# Patient Record
Sex: MALE | Race: WHITE | NOT HISPANIC OR LATINO | Employment: FULL TIME | URBAN - METROPOLITAN AREA
[De-identification: names, ages, dates, MRNs, and addresses within clinical notes are randomized per-mention and may not be internally consistent; named-entity substitution may affect disease eponyms.]

---

## 2018-02-12 ENCOUNTER — HOSPITAL ENCOUNTER (EMERGENCY)
Facility: MEDICAL CENTER | Age: 33
End: 2018-02-12
Attending: EMERGENCY MEDICINE
Payer: COMMERCIAL

## 2018-02-12 VITALS
RESPIRATION RATE: 18 BRPM | BODY MASS INDEX: 32.07 KG/M2 | OXYGEN SATURATION: 98 % | HEART RATE: 54 BPM | SYSTOLIC BLOOD PRESSURE: 129 MMHG | TEMPERATURE: 97.6 F | HEIGHT: 70 IN | DIASTOLIC BLOOD PRESSURE: 76 MMHG | WEIGHT: 223.99 LBS

## 2018-02-12 DIAGNOSIS — L03.211 CELLULITIS OF FACE: ICD-10-CM

## 2018-02-12 DIAGNOSIS — Z86.39 HISTORY OF DIABETES MELLITUS, TYPE II: ICD-10-CM

## 2018-02-12 LAB — GLUCOSE BLD-MCNC: 95 MG/DL (ref 65–99)

## 2018-02-12 PROCEDURE — 700101 HCHG RX REV CODE 250: Performed by: EMERGENCY MEDICINE

## 2018-02-12 PROCEDURE — 82962 GLUCOSE BLOOD TEST: CPT

## 2018-02-12 PROCEDURE — 10160 PNXR ASPIR ABSC HMTMA BULLA: CPT

## 2018-02-12 PROCEDURE — 99283 EMERGENCY DEPT VISIT LOW MDM: CPT

## 2018-02-12 RX ORDER — DOXYCYCLINE 100 MG/1
100 CAPSULE ORAL 2 TIMES DAILY
Qty: 20 CAP | Refills: 0 | Status: SHIPPED | OUTPATIENT
Start: 2018-02-12 | End: 2018-02-22

## 2018-02-12 RX ADMIN — LIDOCAINE HYDROCHLORIDE 8 ML: 20 INJECTION, SOLUTION INFILTRATION; PERINEURAL at 09:45

## 2018-02-12 ASSESSMENT — PAIN SCALES - GENERAL
PAINLEVEL_OUTOF10: 5
PAINLEVEL_OUTOF10: 1
PAINLEVEL_OUTOF10: 5

## 2018-02-12 NOTE — ED NOTES
Pt discharged to home. Pt was given follow up instructions and prescriptions for doxycycline. Pt verbalized understanding of all instructions for discharge and is ambulatory out of ED with steady gait.

## 2018-02-12 NOTE — ED TRIAGE NOTES
Pt ambulated to triage with   Chief Complaint   Patient presents with   • Eye Pain     seen at  and sent here for possible orbital cellulitis; left eye lid and just above eye, swelling noted, thought that it was just a pimple last week and starting saturday got worse.     Pt reports he is a diabetic.  Pt denies vision changes.   Pt also reports that his dog is getting treated for a skin disease and ointment is being given for it.  Pt Informed regarding triage process and verbalized understanding to inform triage tech or RN for any changes in condition. Placed in lobby.

## 2018-02-12 NOTE — ED PROVIDER NOTES
"ED Provider Note    CHIEF COMPLAINT  Chief Complaint   Patient presents with   • Eye Pain     seen at  and sent here for possible orbital cellulitis; left eye lid and just above eye, swelling noted, thought that it was just a pimple last week and starting saturday got worse.       HPI  Ant Inman is a 32 y.o. male who presents sent from urgent care for concerns of orbital cellulitis. Patient is a type II diabetic who takes metformin 800 mg a day and who developed a pimple above his left eye 4 days ago. The patient squeezed it and the redness has spread and there is now edema in the left upper eyelid. Denies headache, fever, eye pain, proptosis, vision change. No diplopia.    REVIEW OF SYSTEMS  Pertinent positives include: Facial infection.  Pertinent negatives include: Fever, vomiting, dizziness.    PAST MEDICAL HISTORY  Past Medical History:   Diagnosis Date   • Bronchitis    • Compartment syndrome     B/L lower extremities   • Hypertension     no meds       SOCIAL HISTORY  planning a trip to [place] in 4 days.    CURRENT MEDICATIONS  Metformin    ALLERGIES  Allergies   Allergen Reactions   • Percocet [Oxycodone-Acetaminophen] Hives and Itching     RXN=4 years ago   • Other Environmental        PHYSICAL EXAM  VITAL SIGNS: /78   Pulse (!) 50   Temp 36.4 °C (97.6 °F) (Temporal)   Resp 16   Ht 1.778 m (5' 10\")   Wt 101.6 kg (223 lb 15.8 oz)   SpO2 98%   BMI 32.14 kg/m²   Constitutional :  Well developed, Well nourished, borderline blood pressure elevation, bradycardic.   HNT: 1-1/2 cm swollen red nonfluctuant area just above left lateral orbital ridge. Edema tracks in the upper eyelid. No proptosis..   Ears: External ears normal.  Eyes: pupils reactive without eye discharge nor conjunctival hyperemia. Extraocular movements intact without diplopia  Neck: Normal range of motion, No tenderness, Supple, No stridor.   Lymphatic: No preauricular or cervical adenopathy.   Cardiovascular: Regular rhythm, No " murmurs, No rubs, No gallops.  No cyanosis.       LABORATORY:  Results for orders placed or performed in visit on 08/13/16   POCT Glucose   Result Value Ref Range    Glucose - Accu-Ck 127 (A) 70 - 100 mg/dL     Visual acuity 20/15 with corrective glasses    COURSE & MEDICAL DECISION MAKING  Well-appearing patient presents with a small facial cellulitis resulting from a comedone without evidence of preseptal cellulitis nor orbital cellulitis. Diabetes is well-controlled    Area of erythema as anesthetized the standard sterile technique with 2 mL of 2% lidocaine. Needle aspiration performed and no abscess cavity encountered. Patient tolerated procedure well.    PLAN:  Discharge Medication List as of 2/12/2018 10:41 AM      START taking these medications    Details   doxycycline (MONODOX) 100 MG capsule Take 1 Cap by mouth 2 times a day for 10 days., Disp-20 Cap, R-0, Print Rx Paper           Ibuprofen and Tylenol  Continue metformin  Facial cellulitis handout given  Return for redness spreading more than 1 inch, high fever, ill appearance, eye or orbit pain, severe headache or ill-appearing.    Ruben Hanks M.D.  [address] [facility] [address]  [phone]    Schedule an appointment as soon as possible for a visit in 3 days  As needed        CONDITION:  Good.    FINAL IMPRESSION:  1. Cellulitis of face    2. History of diabetes mellitus, type II          Electronically signed by: Blue Lopez, 2/12/2018

## 2018-02-12 NOTE — DISCHARGE INSTRUCTIONS
Facial Infection  Take antibiotic as prescribed and continue metformin. Follow-up with the physician for abscess formation or redness spreading more than 1 inch. Also follow-up for protruding eye, periorbital pain, severe headache, uncontrolled vomiting, dizziness, ill appearance.    You have an infection of your face. This requires special attention to help prevent serious problems. Infections in facial wounds can cause poor healing and scars. They can also spread to deeper tissues, especially around the eye. Wound and dental infections can lead to sinusitis, infection of the eye socket, and even meningitis. Permanent damage to the skin, eye, and nervous system may result if facial infections are not treated properly. With severe infections, hospital care for IV antibiotic injections may be needed if they don't respond to oral antibiotics.  Antibiotics must be taken for the full course to insure the infection is eliminated. If the infection came from a bad tooth, it may have to be extracted when the infection is under control. Warm compresses may be applied to reduce skin irritation and remove drainage.  You might need a tetanus shot now if:  · You cannot remember when your last tetanus shot was.   · You have never had a tetanus shot.   · The object that caused your wound was dirty.   If you need a tetanus shot, and you decide not to get one, there is a rare chance of getting tetanus. Sickness from tetanus can be serious. If you got a tetanus shot, your arm may swell, get red and warm to the touch at the shot site. This is common and not a problem.  SEEK IMMEDIATE MEDICAL CARE IF:   · You have increased swelling, redness, or trouble breathing.   · You have a severe headache, dizziness, nausea, or vomiting.   · You develop problems with your eyesight.   · You have a fever.   Document Released: 01/25/2006 Document Revised: 03/11/2013 Document Reviewed: 12/18/2006  [employer]® Patient Information ©2013 [employer].

## 2020-08-28 ENCOUNTER — HOSPITAL ENCOUNTER (OUTPATIENT)
Dept: HOSPITAL 8 - CVU | Age: 35
Discharge: HOME | End: 2020-08-28
Attending: INTERNAL MEDICINE
Payer: COMMERCIAL

## 2020-08-28 DIAGNOSIS — I10: Primary | ICD-10-CM

## 2020-08-28 DIAGNOSIS — R07.9: ICD-10-CM

## 2020-08-28 PROCEDURE — 93306 TTE W/DOPPLER COMPLETE: CPT

## 2022-07-28 ENCOUNTER — TELEPHONE (OUTPATIENT)
Dept: SCHEDULING | Facility: IMAGING CENTER | Age: 37
End: 2022-07-28
Payer: COMMERCIAL

## 2022-07-29 ENCOUNTER — OFFICE VISIT (OUTPATIENT)
Dept: MEDICAL GROUP | Facility: PHYSICIAN GROUP | Age: 37
End: 2022-07-29
Payer: COMMERCIAL

## 2022-07-29 VITALS
OXYGEN SATURATION: 97 % | TEMPERATURE: 98.5 F | BODY MASS INDEX: 26.77 KG/M2 | WEIGHT: 187 LBS | DIASTOLIC BLOOD PRESSURE: 60 MMHG | HEART RATE: 45 BPM | HEIGHT: 70 IN | SYSTOLIC BLOOD PRESSURE: 140 MMHG

## 2022-07-29 DIAGNOSIS — I10 BENIGN ESSENTIAL HTN: ICD-10-CM

## 2022-07-29 DIAGNOSIS — E11.9 TYPE 2 DIABETES MELLITUS WITHOUT COMPLICATION, WITHOUT LONG-TERM CURRENT USE OF INSULIN (HCC): ICD-10-CM

## 2022-07-29 DIAGNOSIS — R10.84 GENERALIZED ABDOMINAL PAIN: ICD-10-CM

## 2022-07-29 PROCEDURE — 99204 OFFICE O/P NEW MOD 45 MIN: CPT | Performed by: STUDENT IN AN ORGANIZED HEALTH CARE EDUCATION/TRAINING PROGRAM

## 2022-07-29 RX ORDER — OMEPRAZOLE 20 MG/1
20 CAPSULE, DELAYED RELEASE ORAL DAILY
Qty: 90 CAPSULE | Refills: 0 | Status: SHIPPED | OUTPATIENT
Start: 2022-07-29 | End: 2022-10-27

## 2022-07-29 ASSESSMENT — PATIENT HEALTH QUESTIONNAIRE - PHQ9: CLINICAL INTERPRETATION OF PHQ2 SCORE: 0

## 2022-07-29 NOTE — PROGRESS NOTES
Subjective:     CC:  Diagnoses of Type 2 diabetes mellitus without complication, without long-term current use of insulin (HCC) and Benign essential HTN were pertinent to this visit.    HISTORY OF THE PRESENT ILLNESS: Patient is a 36 y.o. male. This pleasant patient is here today to establish care and for ER follow-up.. His/her prior PCP was Monica Harrell..    Over the past year the patient has had substantial deliberate weight loss, almost 40 pounds.  In that time he has discontinued his atorvastatin, metformin and 1 blood pressure medication that he believes to be losartan.    1.  Abdominal pain  Patient had a thorough work-up in ER on 7/16/2022.  He was ultimately discharged with a month supply pantoprazole and is to follow-up with primary care.  His symptoms have resolved.    2.  Type 2 diabetes mellitus  Patient tells me previously had A1c's in the 8 range but has lost between 40 and 50 pounds and discontinued his metformin.    3.  Benign essential hypertension  Patient was previously on blood pressure medication, he believes losartan, he has discontinued this.  He does have the ability to take his blood pressure at home.    Active Diagnosis:    Patient Active Problem List   Diagnosis   • Chest pain   • Type 2 diabetes mellitus without complication, without long-term current use of insulin (HCC)   • Benign essential HTN        Current Outpatient Medications Ordered in Epic   Medication Sig Dispense Refill   • omeprazole (PRILOSEC) 20 MG delayed-release capsule Take 1 Capsule by mouth every day. 90 Capsule 0   • tramadol (ULTRAM) 50 MG Tab Take 1-2 Tabs by mouth every 6 hours as needed. (Patient not taking: Reported on 7/29/2022) 30 Tab 0   • meloxicam (MOBIC) 15 MG tablet Take 1 Tab by mouth every day. as needed for pain (Patient not taking: Reported on 7/29/2022) 20 Tab 0   • hydrocodone-acetaminophen (NORCO) 5-325 MG Tab per tablet Take 1 Tab by mouth every 6 hours as needed. (Patient not taking: Reported on  "7/29/2022) 16 Tab 0   • atorvastatin (LIPITOR) 10 MG Tab Take 1 Tab by mouth every bedtime. (Patient not taking: Reported on 7/29/2022) 30 Tab 0   • metformin (GLUCOPHAGE) 500 MG Tab Take 1 Tab by mouth 2 times a day, with meals. (Patient not taking: Reported on 7/29/2022) 60 Tab 0     No current Epic-ordered facility-administered medications on file.     ROS:   Gen: No fevers/chills, no changes in weight  HEENT: No changes in vision/hearing, sore throat.  Pulm: No cough, unexplained SOB.  CV: No chest pain/pressure, no palpitations  GI: No nausea/vomiting, no diarrhea  : No dysuria/nocturia  MSk: No myalgias  Skin: No rash/skin changes  Neuro: No headaches, no numbness/tingling  Heme/Lymph: no easy bruising      Objective:     Exam: BP (!) 140/60 (BP Location: Right arm, Patient Position: Sitting, BP Cuff Size: Adult)   Pulse (!) 45   Temp 36.9 °C (98.5 °F) (Temporal)   Ht 1.778 m (5' 10\")   Wt 84.8 kg (187 lb)   SpO2 97%  Body mass index is 26.83 kg/m².    General: Normal appearing. No distress.  HEENT: Normocephalic. Eyes conjunctiva clear lids without ptosis, pupils equal and reactive to light accommodation. Ears normal shape and contour, canals are clear bilaterally, tympanic membranes are benign, nasal mucosa benign, oropharynx is without erythema, edema or exudates.   Neck: Supple without JVD. Thyroid is not enlarged.  Pulmonary: Clear to ausculation.  Normal effort. No rales, ronchi, or wheezing.  Cardiovascular: Regular rate and rhythm without murmur. Radial pulses are intact and equal bilaterally.  Abdomen: Nondistended.   Neurologic: Grossly nonfocal.  CN II through XII intact.  Lymph: No cervical or supraclavicular lymph nodes are palpable  Skin: Warm and dry.  No obvious lesions.  Musculoskeletal: Normal gait. No extremity cyanosis, clubbing, or edema.  Psych: Normal mood and affect. Alert and oriented x3. Judgment and insight are normal.    A chaperone was offered to the patient during today's " exam. Patient declined chaperone.    Labs:   7/17/2022:  -Hemoglobin A1c 5.9%  -Lipid profile showing total cholesterol 123, triglycerides 64, HDL 42, LDL 68  -CMP showing diminished total protein of 6.3  -CBC, WNL    Assessment & Plan:   36 y.o. male with the following -    1.  Abdominal pain  -Acute uncomplicated illness.  -Finish course of pantoprazole.  -After is completed pantoprazole start omeprazole 20 mg daily.  Dispense 90.  Refill 0.  -We will reevaluate his need for continued PPI at his next visit.    2.  Type 2 diabetes mellitus  -Chronic.  A1c at goal without medications.  -I recommended the patient continue with his current excellent diet and exercise regimen.    3.  Benign essential hypertension  -Unknown status.    -I have asked the patient to return in 1 week with blood pressure log for evaluation.    4.  Encounter to establish care.  -  We discussed diet/exercise/healthy weight maintenance.  We discussed the need to always wear seatbelt.  I have recommended regular dentistry.    Return in about 5 months (around 12/29/2022).  To evaluate the need for continued PPI, diabetes and hypertension monitoring.  Vaccinations as indicated.    Please note that this dictation was created using voice recognition software. I have made every reasonable attempt to correct obvious errors, but I expect that there are errors of grammar and possibly content that I did not discover before finalizing the note.      Brock Enciso PA-C 7/29/2022

## 2022-07-29 NOTE — PATIENT INSTRUCTIONS
1) Complete your Pantoprazole prescription, then start Omeprazole 20 mg for 90 days, then trial a period to see if our symptoms return.    2) Return a seven day blood pressure log.

## 2022-09-07 ENCOUNTER — TELEPHONE (OUTPATIENT)
Dept: MEDICAL GROUP | Facility: PHYSICIAN GROUP | Age: 37
End: 2022-09-07
Payer: COMMERCIAL

## 2022-09-07 NOTE — TELEPHONE ENCOUNTER
----- Message from Brock Enciso P.A.-C. sent at 9/6/2022  5:04 PM PDT -----  Please contact Mr. Hussein by telephone and thank him for sending in a blood pressure log.  His blood pressures look well controlled.  No need for intervention at this point time.    Brock Enciso PA-C

## 2022-09-07 NOTE — PROGRESS NOTES
Patient returns in extensive blood pressure log.  7 highest readings from the previous 7 days show an average systolic 126.  No diastolics greater than 90.    Brock Enciso PA-C

## 2022-09-07 NOTE — TELEPHONE ENCOUNTER
Phone Number Called:[phone] (home)         Call outcome: Spoke to patient regarding message below.    Message: pt notified

## 2022-10-27 RX ORDER — OMEPRAZOLE 20 MG/1
20 CAPSULE, DELAYED RELEASE ORAL DAILY
Qty: 90 CAPSULE | Refills: 0 | Status: SHIPPED | OUTPATIENT
Start: 2022-10-27

## 2024-04-10 ENCOUNTER — OFFICE VISIT (OUTPATIENT)
Dept: URGENT CARE | Facility: CLINIC | Age: 39
End: 2024-04-10
Payer: COMMERCIAL

## 2024-04-10 VITALS
BODY MASS INDEX: 28.63 KG/M2 | HEIGHT: 70 IN | HEART RATE: 66 BPM | TEMPERATURE: 98.2 F | SYSTOLIC BLOOD PRESSURE: 116 MMHG | OXYGEN SATURATION: 95 % | RESPIRATION RATE: 16 BRPM | DIASTOLIC BLOOD PRESSURE: 70 MMHG | WEIGHT: 200 LBS

## 2024-04-10 DIAGNOSIS — B96.89 BACTERIAL URI: ICD-10-CM

## 2024-04-10 DIAGNOSIS — H10.9 BACTERIAL CONJUNCTIVITIS OF LEFT EYE: ICD-10-CM

## 2024-04-10 DIAGNOSIS — J06.9 BACTERIAL URI: ICD-10-CM

## 2024-04-10 DIAGNOSIS — J01.90 ACUTE BACTERIAL SINUSITIS: Primary | ICD-10-CM

## 2024-04-10 DIAGNOSIS — B96.89 ACUTE BACTERIAL SINUSITIS: Primary | ICD-10-CM

## 2024-04-10 PROCEDURE — 3074F SYST BP LT 130 MM HG: CPT

## 2024-04-10 PROCEDURE — 99213 OFFICE O/P EST LOW 20 MIN: CPT

## 2024-04-10 PROCEDURE — 3078F DIAST BP <80 MM HG: CPT

## 2024-04-10 RX ORDER — PREDNISONE 10 MG/1
20 TABLET ORAL DAILY
Qty: 6 TABLET | Refills: 0 | Status: SHIPPED | OUTPATIENT
Start: 2024-04-10 | End: 2024-04-13

## 2024-04-10 RX ORDER — CETIRIZINE HYDROCHLORIDE 10 MG/1
10 TABLET ORAL DAILY
COMMUNITY

## 2024-04-10 RX ORDER — POLYMYXIN B SULFATE AND TRIMETHOPRIM 1; 10000 MG/ML; [USP'U]/ML
1 SOLUTION OPHTHALMIC EVERY 4 HOURS
Qty: 10 ML | Refills: 0 | Status: SHIPPED | OUTPATIENT
Start: 2024-04-10 | End: 2024-04-20

## 2024-04-10 RX ORDER — AMOXICILLIN AND CLAVULANATE POTASSIUM 875; 125 MG/1; MG/1
1 TABLET, FILM COATED ORAL 2 TIMES DAILY
Qty: 14 TABLET | Refills: 0 | Status: SHIPPED | OUTPATIENT
Start: 2024-04-10 | End: 2024-04-17

## 2024-04-10 RX ORDER — FLUTICASONE PROPIONATE 50 MCG
1 SPRAY, SUSPENSION (ML) NASAL DAILY
COMMUNITY

## 2024-04-10 ASSESSMENT — FIBROSIS 4 INDEX: FIB4 SCORE: 0.46

## 2024-04-10 NOTE — PROGRESS NOTES
Subjective:   Ant Inman is a 38 y.o. male who presents for Sinus Problem (Head pressure, (B) ear pressure x 10 days )          I introduced myself to the patient and informed them that I am a family nurse practitioner.    HPI:Ant is a 38 year-old  comes in today c/o sinus pain, pressure, thick green and bloody nasal drainage, cough with green sputum. Onset was over 10 days ago. He also developed redness of his L eye 2 days ago, with thick yellow draiange and crusting of his eyelashes. States his wife was dx and treated for pink eye a week ago. Patient describes symptoms as constant. They describe the cough as productive with green sputum. Aggravating factors include leaning forward, blowing his nose exacerbates sinus pain. Relieving factors include none. Treatments tried at home include none. They describe their symptoms as moderate.Denies any known exposure to Covid, Flu, RSV, strep. Denies anyone else is sick at home presently.  He denies any fever, chills, nausea or vomiting, chest pain, difficulty breathing        Review of Systems   Constitutional:  Positive for chills and malaise/fatigue.   HENT:  Positive for congestion and sinus pain. Negative for ear discharge, ear pain, hearing loss and sore throat.    Eyes:  Positive for discharge and redness. Negative for blurred vision, double vision, photophobia and pain.   Respiratory:  Positive for cough. Negative for hemoptysis, sputum production, shortness of breath and wheezing.    Cardiovascular:  Negative for chest pain and palpitations.   Gastrointestinal:  Negative for heartburn, nausea and vomiting.   Genitourinary:  Negative for dysuria.   Musculoskeletal:  Negative for myalgias.   Skin:  Negative for rash.   Neurological:  Positive for headaches. Negative for dizziness.   Psychiatric/Behavioral:  Negative for depression.    All other systems reviewed and are negative.      Medications: lisinopril Tabs  meloxicam  methocarbamol  "Tabs  methylPREDNISolone Tbpk  omeprazole     Allergies: Oxycodone-acetaminophen, Percocet [oxycodone-acetaminophen], and Other environmental    Problem List: does not have any pertinent problems on file.    Surgical History:  Past Surgical History:   Procedure Laterality Date    FASCIOTOMY  10/4/2011    Performed by DEREJE GIVENS at SURGERY [facility]S    DENTAL EXTRACTION(S)  2006    wisdom teeth x 4    TONSILLECTOMY  2000       Past Social Hx:   reports that he has never smoked. He has never used smokeless tobacco. He reports that he does not currently use alcohol after a past usage of about 0.5 oz of alcohol per week. He reports current drug use. Drugs: Marijuana and Inhaled.     Past Family Hx:   family history includes Diabetes in his unknown relative; Heart Disease in his unknown relative; Hypertension in his unknown relative.     Problem list, medications, and allergies reviewed by myself today in Epic.   I have documented what I find to be significant in regards to past medical, social, family and surgical history  in my HPI or under PMH/PSH/FH review section, otherwise it is noncontributory     Objective:     /70 (BP Location: Left arm, Patient Position: Sitting, BP Cuff Size: Adult long)   Pulse 66   Temp 36.8 °C (98.2 °F) (Temporal)   Resp 16   Ht 1.778 m (5' 10\")   Wt 90.7 kg (200 lb)   SpO2 95%   BMI 28.70 kg/m²     During this visit, appropriate PPE was worn, and hand hygiene was performed.    Physical Exam  Vitals reviewed.   Constitutional:       General: He is not in acute distress.     Appearance: Normal appearance. He is not ill-appearing, toxic-appearing or diaphoretic.   HENT:      Head: Normocephalic and atraumatic.      Right Ear: Tympanic membrane, ear canal and external ear normal. There is no impacted cerumen.      Left Ear: Tympanic membrane, ear canal and external ear normal. There is no impacted cerumen.      Nose: Congestion and rhinorrhea present. Rhinorrhea is " purulent.      Right Turbinates: Swollen.      Left Turbinates: Swollen.      Right Sinus: Maxillary sinus tenderness and frontal sinus tenderness present.      Left Sinus: Maxillary sinus tenderness and frontal sinus tenderness present.      Mouth/Throat:      Mouth: Mucous membranes are moist.      Pharynx: No oropharyngeal exudate or posterior oropharyngeal erythema.   Eyes:      General: No scleral icterus.        Right eye: No discharge.         Left eye: No discharge.      Extraocular Movements: Extraocular movements intact.      Conjunctiva/sclera: Conjunctivae normal.      Pupils: Pupils are equal, round, and reactive to light.      Comments: L eye exam shows no penetrative injury or foreign object noted.  There is injection and erythema of the conjunctivae present which is confined to the conjunctival layer as evidenced by gently moving the conjunctival layer with a moistened Q-tip.  There are traces of mucopurulent drainage visible with some crusting of eye lashes.  No signs of uveitis, hyphema, proptosis, or chemosis.  EOM intact without pain, no periorbital swelling or cellulitis.  Visual acuity is grossly intact. CN II - IV and CN VI grossly intact.    Cardiovascular:      Rate and Rhythm: Normal rate and regular rhythm.      Heart sounds: Normal heart sounds.   Pulmonary:      Effort: Pulmonary effort is normal. No respiratory distress.      Breath sounds: Normal breath sounds. No stridor. No wheezing, rhonchi or rales.   Chest:      Chest wall: No tenderness.   Abdominal:      General: There is no distension.      Palpations: Abdomen is soft.   Genitourinary:     Comments: Deferred  Musculoskeletal:         General: Normal range of motion.      Cervical back: Normal range of motion. No rigidity or tenderness.   Lymphadenopathy:      Cervical: No cervical adenopathy.   Skin:     General: Skin is warm and dry.   Neurological:      General: No focal deficit present.      Mental Status: He is alert and  oriented to person, place, and time. Mental status is at baseline.      Cranial Nerves: No cranial nerve deficit.   Psychiatric:         Mood and Affect: Mood normal.         Behavior: Behavior normal.         Thought Content: Thought content normal.         Judgment: Judgment normal.         Assessment/Plan:     Diagnosis and associated orders:     1. Acute bacterial sinusitis  amoxicillin-clavulanate (AUGMENTIN) 875-125 MG Tab    predniSONE (DELTASONE) 10 MG Tab      2. Bacterial conjunctivitis of left eye  polymixin-trimethoprim (POLYTRIM) 60965-4.1 UNIT/ML-% Solution      3. Bacterial URI  amoxicillin-clavulanate (AUGMENTIN) 875-125 MG Tab    predniSONE (DELTASONE) 10 MG Tab         Comments/MDM:     1. Bacterial conjunctivitis of left eye    Discussed with patient that conjunctivitis can be bacterial, viral or allergic, and treatment approach is different for bacterial versus viral or allergic.  Discussed that his presentation and symptoms are consistent with bacterial conjunctivitis and this is treated with antibiotic drops.   To ease discomfort, apply a cool, clean wash cloth to your eye for 10 to 20 minutes, 3 to 4 times a day.  Gently wipe away any drainage from the eye with a warm, wet washcloth or a cotton ball.  Wash your hands often with soap and use paper towels to dry.  Do not share towels or washcloths. This may spread the infection.  Change or wash your pillowcase every day.  You should not use eye make-up until the infection is gone.  Stop using contacts lenses. Ask your eye professional how to sterilize or replace them before using again. This depends on the type of contact lenses used.  Do not touch the edge of the eyelid with the eye drop bottle or ointment tube when applying medications to the affected eye. This will stop you from spreading the infection to the other eye or to others.  Report any problems that may be related to the prescribed medicine should they develop.    -  polymixin-trimethoprim (POLYTRIM) 47103-2.1 UNIT/ML-% Solution; Administer 1 Drop into both eyes every 4 hours for 10 days.  Dispense: 10 mL; Refill: 0    2. Bacterial URI  - amoxicillin-clavulanate (AUGMENTIN) 875-125 MG Tab; Take 1 Tablet by mouth 2 times a day for 7 days.  Dispense: 14 Tablet; Refill: 0  - predniSONE (DELTASONE) 10 MG Tab; Take 2 Tablets by mouth every day for 3 days.  Dispense: 6 Tablet; Refill: 0    3. Acute bacterial sinusitis  Patient meets Infectious Disease Society of [place] (IDSA)  guidelines for ABRS given duration of symptoms, worsening severity, clinical history and physical exam   We discussed management of sinus infection including -  - supportive care measures such as drinking plenty of fluids, use a humidifier in room at night to keep mucous membranes moist, applying warm compresses to face and forehead may be useful in relieving sinus pain and pressure, using a sinus wash such as the [employer] bottle several times a day as needed, Flonase daily, OTC second-generation non-sedating antihistamine such as Zyrtec, Allegra, or Loratadine daily, alternate Tylenol with ibuprofen (unless contraindicated) for pain and fever.  OTC decongestant of choice. Follow manufacturers guidelines for dosing and safety precautions.   -We did discuss red flags and reasons to return to urgent care versus when to go to the ER.    Discussed DDx, management options (risks,benefits, and alternatives to planned treatment), natural progression.  Questions were encouraged and answered. Written information was provided and I did go over this with the patient in clinic today.  Instructed patient regarding red flags and to return to urgent care prn if new or worsening sx or if there is no improvement in condition prn.    Advised the patient to follow-up with the primary care physician for recheck, reevaluation, and consideration of further management.  I did instruct patient regarding medications prescribed,  purpose, side effects, cautions.  Instructed patient to get a pharmacy consult when picking up any prescribed medications.  Strict ER precautions discussed for any mastoid pain, swelling of the face or around the eyes, visual disturbances, eye pain, chest pain, difficulty breathing, difficulty swallowing, wheezing, stridor, or drooling, fever that does not respond to ibuprofen and Tylenol, inability to tolerate fluids, dehydration, especially in the setting of fever, chills, nausea or vomiting, lethargy.     Patient states they have good understanding and they are agreeable with the plan of care.     - amoxicillin-clavulanate (AUGMENTIN) 875-125 MG Tab; Take 1 Tablet by mouth 2 times a day for 7 days.  Dispense: 14 Tablet; Refill: 0  - predniSONE (DELTASONE) 10 MG Tab; Take 2 Tablets by mouth every day for 3 days.  Dispense: 6 Tablet; Refill: 0         Pt is clinically stable at today's acute urgent care visit. Vital signs are normal and reassuring.  No acute distress noted. Appropriate for outpatient management at this time.        I personally reviewed prior external notes and test results pertinent to today's visit.  I have independently reviewed and interpreted all diagnostics ordered during this urgent care acute visit.        Please note that this dictation was created using voice recognition software. I have made a reasonable attempt to correct obvious errors, but I expect that there are errors of grammar and possibly content that I did not discover before finalizing the note.    This note was electronically signed by Dami MARIA, VERO, CARMELA, ELBA

## 2024-04-10 NOTE — LETTER
April 10, 2024    To Whom It May Concern:         This is confirmation that Ant Inman attended his scheduled appointment with YADIEL Marcelino on 4/10/24. They are medically excused form work due to illness from 04/10/2024 through 04/12/2024. They may return to work on 04/13/2024.          If you have any questions please do not hesitate to call me at the phone number listed below.    Sincerely,          LILY Marcelino.  [phone]

## 2024-04-15 ASSESSMENT — ENCOUNTER SYMPTOMS
HEARTBURN: 0
CHILLS: 1
BLURRED VISION: 0
SHORTNESS OF BREATH: 0
DEPRESSION: 0
DIZZINESS: 0
EYE PAIN: 0
EYE REDNESS: 1
DOUBLE VISION: 0
PALPITATIONS: 0
COUGH: 1
SORE THROAT: 0
PHOTOPHOBIA: 0
SPUTUM PRODUCTION: 0
WHEEZING: 0
MYALGIAS: 0
NAUSEA: 0
HEMOPTYSIS: 0
HEADACHES: 1
EYE DISCHARGE: 1
SINUS PAIN: 1
VOMITING: 0

## 2025-04-02 ENCOUNTER — RESULTS FOLLOW-UP (OUTPATIENT)
Dept: URGENT CARE | Facility: CLINIC | Age: 40
End: 2025-04-02

## 2025-04-02 ENCOUNTER — OFFICE VISIT (OUTPATIENT)
Dept: URGENT CARE | Facility: CLINIC | Age: 40
End: 2025-04-02
Payer: COMMERCIAL

## 2025-04-02 VITALS
HEIGHT: 70 IN | SYSTOLIC BLOOD PRESSURE: 116 MMHG | BODY MASS INDEX: 30.92 KG/M2 | WEIGHT: 216 LBS | OXYGEN SATURATION: 97 % | DIASTOLIC BLOOD PRESSURE: 66 MMHG | RESPIRATION RATE: 16 BRPM | TEMPERATURE: 97.8 F | HEART RATE: 85 BPM

## 2025-04-02 DIAGNOSIS — E11.9 TYPE 2 DIABETES MELLITUS WITHOUT COMPLICATION, WITHOUT LONG-TERM CURRENT USE OF INSULIN (HCC): ICD-10-CM

## 2025-04-02 DIAGNOSIS — U07.1 COVID-19: ICD-10-CM

## 2025-04-02 LAB
FLUAV RNA SPEC QL NAA+PROBE: NEGATIVE
FLUBV RNA SPEC QL NAA+PROBE: NEGATIVE
RSV RNA SPEC QL NAA+PROBE: NEGATIVE
SARS-COV-2 RNA RESP QL NAA+PROBE: POSITIVE

## 2025-04-02 PROCEDURE — 0241U POCT CEPHEID COV-2, FLU A/B, RSV - PCR: CPT

## 2025-04-02 PROCEDURE — 99213 OFFICE O/P EST LOW 20 MIN: CPT

## 2025-04-02 ASSESSMENT — ENCOUNTER SYMPTOMS
HEADACHES: 1
SPUTUM PRODUCTION: 1
CHILLS: 0
MYALGIAS: 1
ABDOMINAL PAIN: 0
NAUSEA: 0
STRIDOR: 0
DIARRHEA: 0
NECK PAIN: 0
EYE PAIN: 0
SHORTNESS OF BREATH: 1
SORE THROAT: 1
EYE DISCHARGE: 0
EYE REDNESS: 0
SINUS PAIN: 1
COUGH: 1
VOMITING: 0
WHEEZING: 0
FEVER: 0

## 2025-04-02 NOTE — PROGRESS NOTES
Subjective:     Chief Complaint   Patient presents with    Cough     Chest tightness, head pressure x 4 days       HPI:  Ant Inman is a 39 y.o. male who presents for symptoms which started Sunday. Pt reports a cough, nasal congestion, sinus pressure, mild sore throat, fatigue, malaise, and body aches. He reports some chest tightness that is improved with albuterol breathng treatment at home. Denies chest pain, shortness of breath, or wheezing. Denies h/o asthma/copd/CAP. No immunocompromise. Has tried OTC cold medications without significant relief of symptoms. No recent ABX use. No other aggravating or alleviating factors. Wife with similar symptoms but he states her symptoms are more mild.       ROS:  Review of Systems   Constitutional:  Positive for malaise/fatigue. Negative for chills and fever.   HENT:  Positive for congestion, sinus pain and sore throat. Negative for ear discharge, ear pain and hearing loss.    Eyes:  Negative for pain, discharge and redness.   Respiratory:  Positive for cough, sputum production and shortness of breath. Negative for wheezing and stridor.    Cardiovascular:  Negative for chest pain.   Gastrointestinal:  Negative for abdominal pain, diarrhea, nausea and vomiting.   Genitourinary:  Negative for dysuria.   Musculoskeletal:  Positive for myalgias. Negative for neck pain.   Skin:  Negative for rash.   Neurological:  Positive for headaches.        CURRENT MEDICATIONS:  Current Outpatient Medications   Medication Sig Refill Last Dispense    cetirizine (ZYRTEC) 10 MG Tab Take 10 mg by mouth every day.  Unknown (patient-reported)    fluticasone (FLONASE) 50 MCG/ACT nasal spray Administer 1 Spray into affected nostril(S) every day.  Unknown (patient-reported)    lisinopril (PRINIVIL) 20 MG Tab Take 20 mg by mouth every day. (Patient not taking: Reported on 4/10/2024)  Unknown (patient-reported)    meloxicam (MOBIC) 15 MG tablet Take one tablet by mouth with food for 14-21 days,  "then once daily as needed. No advil/aleve/motrin/ibuprofen on same day. (Patient not taking: Reported on 4/10/2024) 5 Unknown (outside pharmacy)    methocarbamol (ROBAXIN-750) 750 MG Tab One tablet (750 mg) every 6-8 hours as needed for muscle spasms (Patient not taking: Reported on 4/10/2024) 5 Unknown (outside pharmacy)    omeprazole (PRILOSEC) 20 MG delayed-release capsule TAKE 1 CAPSULE BY MOUTH EVERY DAY (Patient not taking: Reported on 4/10/2024) 0 Unknown (outside pharmacy)       ALLERGIES:   Allergies   Allergen Reactions    Oxycodone-Acetaminophen Hives and Itching     RXN=4 years ago      Percocet [Oxycodone-Acetaminophen] Hives and Itching     RXN=4 years ago    Other Environmental        PROBLEM LIST:    does not have any pertinent problems on file.    Allergies, Medications, & Tobacco/Substance Use were reconciled by the Medical Assistant and reviewed by myself.     Objective:   /66 (BP Location: Left arm, Patient Position: Sitting, BP Cuff Size: Small adult)   Pulse 85   Temp 36.6 °C (97.8 °F) (Temporal)   Resp 16   Ht 1.778 m (5' 10\")   Wt 98 kg (216 lb)   SpO2 97%   BMI 30.99 kg/m²     Physical Exam  Constitutional:       General: He is not in acute distress.     Appearance: He is not ill-appearing or toxic-appearing.   HENT:      Right Ear: Tympanic membrane normal.      Left Ear: Tympanic membrane normal.      Mouth/Throat:      Mouth: Mucous membranes are moist.      Pharynx: Posterior oropharyngeal erythema present.   Eyes:      Conjunctiva/sclera: Conjunctivae normal.      Pupils: Pupils are equal, round, and reactive to light.   Cardiovascular:      Rate and Rhythm: Normal rate and regular rhythm.   Pulmonary:      Effort: Pulmonary effort is normal. No respiratory distress.      Breath sounds: Normal breath sounds. No wheezing, rhonchi or rales.   Musculoskeletal:      Cervical back: No tenderness.   Lymphadenopathy:      Cervical: No cervical adenopathy.   Skin:     General: Skin " is warm and dry.   Neurological:      Mental Status: He is alert.         Assessment/Plan:   Pt's history and physical exam consistent with viral URI with cough. Testing performed in office with positive covid 19 results.  Patient informed of results via [facility].  Patient has stable vital signs and is non-toxic appearing. Discussed symptoms are likely secondary to a viral illness.  Discussed supportive care measures and maintaining adequate hydration. Patient demonstrated understanding of treatment plan and agreed to return to the clinic if symptoms worsen or fail to resolve.      Assessment & Plan  COVID-19  Orders:    POCT CEPHEID COV-2, FLU A/B, RSV - PCR  - Discussed symptoms most likely viral and self limiting illness, symptoms can last up to 2 weeks. No evidence of a bacterial process.   - Encouraged pt to treat symptoms by using humidified air, salt water gargles, and/or sipping warm liquids.   - Educated pt on use of OTC tylenol or ibuprofen (if no contraindications) per package instructions for body aches, headaches, pain from sore throat. Discussed OTC topical analgesic spray or lozenges. Discussed nonsedating antihistamine like Zyrtec (cetirizine) or Allegra (fexofenadine) to reduce the amount of nasal inflammation.  - Pt encouraged to practice hand hygiene, wear a face mask in public or self isolate, remain well hydrated, and get sufficient rest/sleep.     Type 2 diabetes mellitus without complication, without long-term current use of insulin (HCC)   - Patient has type 2 diabetes, states their glucose has been controlled, no major symptoms associated, I did advise the risks of elevated glucose while not feeling well.  Patient encouraged to drink more fluids and check glucose more often while feeling sick.        Discussed differential diagnosis, management options, risks/benefits, and alternatives to planned treatment. Pt expressed understanding and the treatment plan was agreed upon. Questions were  encouraged and answered. Pt encouraged to return to urgent care as needed if new or worsening symptoms or if there is no improvement in condition. Pt educated in red flags and indications to immediately call 911 or present to the Emergency Department. Advised the patient to follow-up with the primary care physician for recheck, reevaluation, and further management.    I personally reviewed prior external notes and test results pertinent to today's visit. I have independently reviewed and interpreted all diagnostics ordered during this visit.    Please note that this dictation was created using voice recognition software. I have made a reasonable attempt to correct obvious errors, but I expect that there are errors of grammar and possibly content that I did not discover before finalizing the note.    This note was electronically signed by CROW Carey

## 2025-04-02 NOTE — ASSESSMENT & PLAN NOTE
- Patient has type 2 diabetes, states their glucose has been controlled, no major symptoms associated, I did advise the risks of elevated glucose while not feeling well.  Patient encouraged to drink more fluids and check glucose more often while feeling sick.